# Patient Record
(demographics unavailable — no encounter records)

---

## 2024-10-16 NOTE — HISTORY OF PRESENT ILLNESS
[FreeTextEntry1] : Medical Hx: HTN, Fatigue, anxiety/ depression, ADHD, asthma Family Hx: Familial heart disease    Gained 50lbs since the pandemic  Past Wt Loss Attempts: whole 30, keto, IF, Noom, tracked intact, saw RD Highest Adult Wt: 258 Lowest Adult Wt: 180  Previous medications: Vyance--fatigue  Zepbound in Jan-april, lost 15 lbs Wt 258>230   Dietary Pattern: 3 meals per day B-eggs, avocado toast  L-sandwich or salad (buys lunch by work)  D-Cooks lot of veggies at home with turkey OR a Wrap  S-ice cream  Loss of control with eating: Stress eating  Do you wake up at night to eat: in the past; not any more    Exercise Regimen: Teaches yoga; Gym-cardio mostly  Sleep: 8-9hrs; Snoring  Stress Level: Practices breathing exercises and is in therapy  Gyn: Trying to conceive  Social Hx: Dad passed (61 yr old), changed jobs since the pandemic    Labs:4/2024 TSG 1.33 Creatinine:  0.71 GFR: 114  Tanita: Fat%40. %, Muscle mass 134 lbs, TBW% 40.7%, Bone Mass 7. lbs, BMR 1975  9/3/24:  This visit was provided via telehealth using real-time 2-way audio visual technology. The patient was located at home at the time of the visit. The provider, EFREN ROMAN, was located in the office ([place]) at the time of the visit. The patient and provider participated in the telehealth encounter. Patient consented to video call.  -10lbs on her home scale. Metformin XR 1000 mg with dinner; no GI upset. IR, low carb. Allows some flexibility on weekends. Adding seeds to yogurt. Lots of veggies. Weight training/cardio, yoga. 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    10/15/24: This visit was provided via telehealth using real-time 2-way audio visual technology. The patient was located at home at the time of the visit. The provider, EFREN ROMAN, was located in the office ([place]) at the time of the visit. The patient and provider participated in the telehealth encounter. Patient consented to video call.  Metformin XR 1000 mg with dinner; no GI upset. IR, low carb/cycling. No s/e. Working out less due to work; gym once a week; yoga 1-2 times per wk; walking. 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 Lipids improved in most recent labs. Seeing therapist.

## 2024-10-16 NOTE — ASSESSMENT
[FreeTextEntry1] :   -Increase Metformin to 1,500 mg daily. -Increase physical activity once work permits. -Congratulated on all the work she is doing to improve her health.   RTO 11/2024

## 2024-11-27 NOTE — ASSESSMENT
[FreeTextEntry1] :  Continue with Metformin 1000 mg daily with dinner Re-start tracking on you; referred to RD. Adjust activity tracker goals on watch as follows: Zheng 500, 45 min activity, Steps 8,000 (from 6,000).  RTO 1/2025

## 2024-11-27 NOTE — HISTORY OF PRESENT ILLNESS
[FreeTextEntry1] : Medical Hx: HTN, Fatigue, anxiety/ depression, ADHD, asthma Family Hx: Familial heart disease    Gained 50lbs since the pandemic  Past Wt Loss Attempts: whole 30, keto, IF, Noom, tracked intact, saw RD Highest Adult Wt: 258 Lowest Adult Wt: 180  Previous medications: Vyance--fatigue  Zepbound in Jan-april, lost 15 lbs Wt 258>230   Dietary Pattern: 3 meals per day B-eggs, avocado toast  L-sandwich or salad (buys lunch by work)  D-Cooks lot of veggies at home with turkey OR a Wrap  S-ice cream  Loss of control with eating: Stress eating  Do you wake up at night to eat: in the past; not any more    Exercise Regimen: Teaches yoga; Gym-cardio mostly  Sleep: 8-9hrs; Snoring  Stress Level: Practices breathing exercises and is in therapy  Gyn: Trying to conceive  Social Hx: Dad passed (61 yr old), changed jobs since the pandemic    Labs:4/2024 TSG 1.33 Creatinine:  0.71 GFR: 114  Tanita: Fat%40. %, Muscle mass 134 lbs, TBW% 40.7%, Bone Mass 7. lbs, BMR 1975  9/3/24:  This visit was provided via telehealth using real-time 2-way audio visual technology. The patient was located at home at the time of the visit. The provider, EFREN ROMAN, was located in the office ([place]) at the time of the visit. The patient and provider participated in the telehealth encounter. Patient consented to video call.  -10lbs on her home scale. Metformin XR 1000 mg with dinner; no GI upset. IR, low carb. Allows some flexibility on weekends. Adding seeds to yogurt. Lots of veggies. Weight training/cardio, yoga. 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    10/15/24: This visit was provided via telehealth using real-time 2-way audio visual technology. The patient was located at home at the time of the visit. The provider, EFREN ROMAN, was located in the office ([place]) at the time of the visit. The patient and provider participated in the telehealth encounter. Patient consented to video call.  Metformin XR 1000 mg with dinner; no GI upset. IR/carb cycling. Working out less due to work; gym once a week; yoga 1-2 times per wk; walking. 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 Lipids improved in most recent labs. Seeing therapist.  11/27/24: This visit was provided via telehealth using real-time 2-way audio visual technology. The patient was located at home at the time of the visit. The provider, EFREN ROMAN, was located in the office ([place]) at the time of the visit. The patient and provider participated in the telehealth encounter. Patient consented to video call.  Metformin XR 1000 mg with dinner; no GI upset. Stopped doing keto. Using smart watch, 7,000 steps per day. Coaching cheerleading and teaching yoga. Mood much better, in a better place generally after job change. Will have more time for physical activity during school winter break. Trying to conceive.

## 2025-05-01 NOTE — ASSESSMENT
[FreeTextEntry1] :  Increase Metformin to 2000 mg daily See fertility specialist, trying to conceive for >1 yr Increase walking as tolerated Plans to add resistance training once wrist heals completely.   RTO 7/2025

## 2025-05-01 NOTE — HISTORY OF PRESENT ILLNESS
[FreeTextEntry1] : Medical Hx: HTN, Fatigue, anxiety/ depression, ADHD, asthma Family Hx: Familial heart disease    Gained 50lbs since the pandemic  Past Wt Loss Attempts: whole 30, keto, IF, Noom, tracked intact, saw RD Highest Adult Wt: 258 Lowest Adult Wt: 180  Previous medications: Vyance--fatigue  Zepbound in Jan-april, lost 15 lbs Wt 258>230   Dietary Pattern: 3 meals per day B-eggs, avocado toast  L-sandwich or salad (buys lunch by work)  D-Cooks lot of veggies at home with turkey OR a Wrap  S-ice cream  Loss of control with eating: Stress eating  Do you wake up at night to eat: in the past; not any more    Exercise Regimen: Teaches yoga; Gym-cardio mostly  Sleep: 8-9hrs; Snoring  Stress Level: Practices breathing exercises and is in therapy  Gyn: Trying to conceive  Social Hx: Dad passed (61 yr old), changed jobs since the pandemic    Labs:4/2024 TSG 1.33 Creatinine:  0.71 GFR: 114  Tanita: Fat%40. %, Muscle mass 134 lbs, TBW% 40.7%, Bone Mass 7. lbs, BMR 1975  9/3/24:  This visit was provided via telehealth using real-time 2-way audio visual technology. The patient was located at home at the time of the visit. The provider, EFREN ROMAN, was located in the office ([place]) at the time of the visit. The patient and provider participated in the telehealth encounter. Patient consented to video call.  -10lbs on her home scale. Metformin XR 1000 mg with dinner; no GI upset. IR, low carb. Allows some flexibility on weekends. Adding seeds to yogurt. Lots of veggies. Weight training/cardio, yoga. 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    10/15/24: This visit was provided via telehealth using real-time 2-way audio visual technology. The patient was located at home at the time of the visit. The provider, EFREN ROMAN, was located in the office ([place]) at the time of the visit. The patient and provider participated in the telehealth encounter. Patient consented to video call.  Metformin XR 1000 mg with dinner; no GI upset. IR/carb cycling. Working out less due to work; gym once a week; yoga 1-2 times per wk; walking. 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 Lipids improved in most recent labs. Seeing therapist.  11/27/24: This visit was provided via telehealth using real-time 2-way audio visual technology. The patient was located at home at the time of the visit. The provider, EFREN ROMAN, was located in the office ([place]) at the time of the visit. The patient and provider participated in the telehealth encounter. Patient consented to video call.  Metformin XR 1000 mg with dinner; no GI upset. Stopped doing keto. Using smart watch, 7,000 steps per day. Coaching cheerleading and teaching yoga. Mood much better, in a better place generally after job change. Will have more time for physical activity during school winter break. Trying to conceive.   5/1/125: This visit was provided via telehealth using real-time 2-way audio visual technology. The patient was located at home at the time of the visit. The provider, EFREN ROMAN, was located in the office ([place]) at the time of the visit. The patient and provider participated in the telehealth encounter. Patient consented to video call.  Metformin XR 1500 mg with dinner; tolerating well. Went back to keto.  Fractured her wrist due to a fall; was in a brace until yesterday.  Walks with neighbor. Got engaged.